# Patient Record
Sex: MALE | URBAN - METROPOLITAN AREA
[De-identification: names, ages, dates, MRNs, and addresses within clinical notes are randomized per-mention and may not be internally consistent; named-entity substitution may affect disease eponyms.]

---

## 2017-11-06 ENCOUNTER — TRANSFERRED RECORDS (OUTPATIENT)
Dept: HEALTH INFORMATION MANAGEMENT | Facility: CLINIC | Age: 52
End: 2017-11-06

## 2017-11-21 ENCOUNTER — TRANSFERRED RECORDS (OUTPATIENT)
Dept: HEALTH INFORMATION MANAGEMENT | Facility: CLINIC | Age: 52
End: 2017-11-21

## 2017-12-08 ENCOUNTER — TRANSFERRED RECORDS (OUTPATIENT)
Dept: HEALTH INFORMATION MANAGEMENT | Facility: CLINIC | Age: 52
End: 2017-12-08

## 2018-01-04 ENCOUNTER — TRANSFERRED RECORDS (OUTPATIENT)
Dept: HEALTH INFORMATION MANAGEMENT | Facility: CLINIC | Age: 53
End: 2018-01-04

## 2018-01-08 ENCOUNTER — TRANSFERRED RECORDS (OUTPATIENT)
Dept: HEALTH INFORMATION MANAGEMENT | Facility: CLINIC | Age: 53
End: 2018-01-08

## 2018-01-09 ENCOUNTER — TRANSFERRED RECORDS (OUTPATIENT)
Dept: HEALTH INFORMATION MANAGEMENT | Facility: CLINIC | Age: 53
End: 2018-01-09

## 2018-01-16 ENCOUNTER — TRANSFERRED RECORDS (OUTPATIENT)
Dept: HEALTH INFORMATION MANAGEMENT | Facility: CLINIC | Age: 53
End: 2018-01-16

## 2018-02-20 ENCOUNTER — PRE VISIT (OUTPATIENT)
Dept: NEUROLOGY | Facility: CLINIC | Age: 53
End: 2018-02-20

## 2018-02-20 RX ORDER — ESCITALOPRAM OXALATE 10 MG/1
10 TABLET ORAL DAILY
COMMUNITY
Start: 2018-01-25

## 2018-02-20 RX ORDER — AMITRIPTYLINE HYDROCHLORIDE 10 MG/1
20 TABLET ORAL AT BEDTIME
COMMUNITY
Start: 2018-01-25

## 2018-02-20 NOTE — TELEPHONE ENCOUNTER
[facility] CLINICAL DOCUMENTATION    Pre-Visit Planning   PREVISIT INFORMATION                                                    Jose Alberto Sofi scheduled for future visit at [facility] specialty clinics.    Patient is scheduled to see Walk (provider) on 02/27/18 (date)  Reason for visit: HSP  Referring provider Pedro Alonzo  Has patient seen previous specialist? Yes.  Name of provider Dr. Alonzo , Clinic/Facility [facility].   Medical Records:  recvd from [facility] and [facility]-   [facility] recvd with waveforms  MRIs requested to be pushed and archiving form sent to imaging        REVIEW                                                      New patient packet mailed to patient: Yes  Medication reconciliation complete: Yes      No current outpatient prescriptions on file.       Allergies: Review of patient's allergies indicates not on file.    (insert provider dot-phrase for provider specific visit requirements)    PLAN/FOLLOW-UP NEEDED                                                      Previsit review complete.  Patient will see provider at future scheduled appointment.     Patient Reminders Given:  Please, make sure you bring an updated list of your medications.   If you are having a procedure, please, present 15 minutes early.  If you need to cancel or reschedule,please call [phone].    Darla Severin-Brown

## 2018-02-27 ENCOUNTER — OFFICE VISIT (OUTPATIENT)
Dept: NEUROLOGY | Facility: CLINIC | Age: 53
End: 2018-02-27
Payer: COMMERCIAL

## 2018-02-27 VITALS
OXYGEN SATURATION: 99 % | WEIGHT: 174 LBS | DIASTOLIC BLOOD PRESSURE: 86 MMHG | BODY MASS INDEX: 24.36 KG/M2 | HEIGHT: 71 IN | SYSTOLIC BLOOD PRESSURE: 121 MMHG | HEART RATE: 119 BPM

## 2018-02-27 DIAGNOSIS — G11.4 HSP (HEREDITARY SPASTIC PARAPLEGIA) (H): Primary | ICD-10-CM

## 2018-02-27 PROCEDURE — 99204 OFFICE O/P NEW MOD 45 MIN: CPT | Performed by: PSYCHIATRY & NEUROLOGY

## 2018-02-27 ASSESSMENT — PAIN SCALES - GENERAL: PAINLEVEL: NO PAIN (0)

## 2018-02-27 NOTE — MR AVS SNAPSHOT
After Visit Summary   2/27/2018    Jose Alberto Farah    MRN: [id number]           Patient Information     Date Of Birth          1965        Visit Information        Provider Department      2/27/2018 2:30 PM Jose Alberto Santiago MD [facility]        Care Instructions    Thank you for choosing [facility] in [place]. It was a pleasure to see you for your office visit today.     The following is a summary of your office visit:    We will be in contact with you tomorrow in regards to coordinating your follow up appointment and any additional testing. We would like to see you back if possible March 8th and the [facility] ([facility])    You were provided  a copy of Dr. Santiago's business card which has all of the contact information needed for him. It is also listed below    Dr. Pamela MD  Neurology  [facility]  [address] [place] [address].    Phone [phone]  Fax [phone]       Please don't hesitate to call us with any additional questions or concerns at the number listed below.  Nurse/clinic contact information: [facility] [phone].             Follow-ups after your visit        Who to contact     If you have questions or need follow up information about today's clinic visit or your schedule please contact [facility] directly at [phone].  Normal or non-critical lab and imaging results will be communicated to you by MyChart, letter or phone within 4 business days after the clinic has received the results. If you do not hear from us within 7 days, please contact the clinic through MyChart or phone. If you have a critical or abnormal lab result, we will notify you by phone as soon as possible.  Submit refill requests through [employer] or call your pharmacy and they will forward the refill request to us. Please allow 3 business days for your refill to be completed.          Additional  "Information About Your Visit        Soceaniqhart Information     [employer] is an electronic gateway that provides easy, online access to your medical records. With [employer], you can request a clinic appointment, read your test results, renew a prescription or communicate with your care team.     To sign up for [employer] visit the website at [email or web address]   You will be asked to enter the access code listed below, as well as some personal information. Please follow the directions to create your username and password.     Your access code is: [email or web address]  Expires: 2018  4:29 PM     Your access code will  in 90 days. If you need help or a new code, please contact your [facility] or call [phone] for assistance.        Care EveryWhere ID     This is your Care EveryWhere ID. This could be used by other organizations to access your [place] medical records  [id number]        Your Vitals Were     Pulse Height Pulse Oximetry BMI (Body Mass Index)          119 1.803 m (5' 11\") 99% 24.27 kg/m2         Blood Pressure from Last 3 Encounters:   18 121/86    Weight from Last 3 Encounters:   18 78.9 kg (174 lb)              Today, you had the following     No orders found for display       Primary Care Provider Office Phone # Fax #    Glory Roe [phone]       [facility] [address] [place] [address]        Equal Access to Services     [facility] AH: Hadii aad ku hadasho Soomaali, waaxda luqadaha, qaybta kaalmada adeegyada, waxay nancy arana la'solitarion . So [facility] [phone].    ATENCIÓN: Si habla español, tiene a jiang disposición servicios gratuitos de asistencia lingüística. Llame al [phone].    We comply with applicable federal civil rights laws and [place] laws. We do not discriminate on the basis of race, color, national origin, age, disability, sex, sexual orientation, or gender identity.            Thank " you!     Thank you for choosing [facility]  for your care. Our goal is always to provide you with excellent care. Hearing back from our patients is one way we can continue to improve our services. Please take a few minutes to complete the written survey that you may receive in the mail after your visit with us. Thank you!             Your Updated Medication List - Protect others around you: Learn how to safely use, store and throw away your medicines at www.disposemymeds.org.          This list is accurate as of 2/27/18  4:29 PM.  Always use your most recent med list.                   Brand Name Dispense Instructions for use Diagnosis    amitriptyline 10 MG tablet    ELAVIL     Take 20 mg by mouth At Bedtime        escitalopram 10 MG tablet    LEXAPRO     Take 10 mg by mouth daily

## 2018-02-27 NOTE — NURSING NOTE
"Jose Alberto Farah's goals for this visit include: consult  He requests these members of his care team be copied on today's visit information:     PCP: Glory Roe    Referring Provider:  Pedro Alonzo MD  [facility]  [address] [place] [address]    Chief Complaint   Patient presents with     Consult     Neuropathy       Initial /86  Pulse 119  Ht 1.803 m (5' 11\")  Wt 78.9 kg (174 lb)  SpO2 99%  BMI 24.27 kg/m2 Estimated body mass index is 24.27 kg/(m^2) as calculated from the following:    Height as of this encounter: 1.803 m (5' 11\").    Weight as of this encounter: 78.9 kg (174 lb).  Medication Reconciliation: complete    "

## 2018-02-27 NOTE — PATIENT INSTRUCTIONS
Thank you for choosing [facility] in [place]. It was a pleasure to see you for your office visit today.     The following is a summary of your office visit:    We will be in contact with you tomorrow in regards to coordinating your follow up appointment and any additional testing. We would like to see you back if possible March 8th and the [facility] ([facility])    You were provided  a copy of Dr. Santiago's business card which has all of the contact information needed for him. It is also listed below    Dr. Pamela MD  Neurology  [facility]  [address] [place] [address] [place] [address].    Phone [phone]  Fax [phone]       Please don't hesitate to call us with any additional questions or concerns at the number listed below.  Nurse/clinic contact information: [facility] [phone].

## 2018-02-27 NOTE — LETTER
2018         RE: Jose Alberto Farah  [address]  [facility] [address]        Dear Colleague,    Thank you for referring your patient, Jose Alberto Farah, to the [facility]. Please see a copy of my visit note below.    [id number]    2018            Glory Roe MD   [address]   [address]   [place]   [address]      RE:  Jose Alberto Farah   MRN:  [id number]   :  1965      Dear Doctors:      I saw Jose Alberto Farah in neuromuscular consultation at Dr. Pedro Alonzo' request today at the [facility] in [place].  Dr. Alonzo referred Mr. Farah for consideration of possible hereditary spastic paraparesis.      Mr. Farah is a 52-year-old man who reports that since early spring of 2017, he has had difficulty walking.  Initially this seemed to affect bilateral lower extremities in an equal fashion; since then he has also developed weakness in the upper extremities, right greater than left.  Symptoms have been progressive and not associated with substantial positive or negative sensory symptoms.  He has had spasms in the legs upon awakening and stretching in the morning.  He has observed, when I inquired, fasciculations in the right upper extremity as well.  He is referred for further evaluation of this problem.      PAST MEDICAL HISTORY:  Relatively unremarkable.      SOCIAL HISTORY:  Notable for the fact that he was a very heavy drinker.  He reports that, until he developed significant balance difficulty from this condition and while was still working,  he would have 6-7 beers and 2-3 shots in the evenings with coworkers after work.  He also smokes about a half pack per day.  He was a .  He is currently on short-term disability because of his medical condition and anticipates moving towards longterm disability.  He lives with a roommate but the roommate is not described as a caretaker.      FAMILY  HISTORY:  Notable for 1 living brother.  He has no children.  His parents are alive, but his mother developed dementia in her early 80s.  His father was one of 9 children, some of who may have neuropathy.  His mother has no siblings and there is no other history of neuromuscular disease in the family.      The patient's full past medical history, social history, allergy list, medication list, family history, occupational history and system review are documented in the electronic medical record and were personally reviewed by me today.      PHYSICAL EXAMINATION:   VITAL SIGNS:  Normal, aside from an initial pulse of 119.  The patient indicates that he has always had a high heart rate.  This was checked after he had walked some distance into the examining room with a 4-wheeled walker.  I rechecked his pulse after he had rested for a few minutes and it was 96 at the time.      Note that Mr. Farah denies symptoms of bulbar dysfunction, nonrestorative sleep, dyspnea or orthopnea.      EXAMINATION:  Neuromuscular examination demonstrates the following: The patient is alert and cooperative.  Speech, language and affect are normal.  He is fully oriented.  Rapid labial, lingual and guttural sounds are produced well.  Pupils are equal, round and reactive to light.  Extraocular movements are full.  Orbicularis oris and orbicularis oculi strength are normal.  The palate elevates well and in the midline upon phonation.  Tongue bulk, coordination and strength are normal.  Pterygoid strength is normal.  Neck flexion and extension strength are normal.  Sensory examination is notable for preserved perception of pin and light touch.  Vibration scores are 5 and 4 at the right and left great toes, respectively.  Motor examination is notable for equivocal increase in tone in the upper extremities and a moderate increase in tone in the lower extremities.  No fasciculations are noted.  There is not definite atrophy.  Manual muscle testing  demonstrates full strength except for 4/5 weakness of right EDC, FDI, and APB.  Despite his difficulty walking, manual muscle testing does appear to demonstrate good strength in the lower extremities.  Reflexes are present at the jaw, 3+ throughout the limbs with horizontal and vertical spread throughout and prominent Makenna signs, and with upgoing toes bilaterally.  Rapid repetitive movements are reduced in the right upper extremity, equivocally reduced in the left upper extremity, and markedly reduced in bilateral lower extremities.  There is no dysmetria.  As noted previously, he does walk independently with a 4-wheeled walker.      MEDICAL RECORD REVIEW:  Vitamin B12 and methylmalonic acid levels are normal.  Imaging of the cervical, thoracic and lumbar spine are unremarkable.  Electrodiagnostic studies demonstrate asymmetric reduction in compound muscle action potential amplitudes in the lower limbs and evidence of chronic denervation and reinnervation in bilateral lower limbs and fibrillations and positive waves in bilateral lower limbs, involving multiple segments on the right.  There is evidence of chronic motor unit changes, principally in the right lower limb.  Fasciculations are rare and present only in the right vastus lateralis and right vastus medialis.  Sensory nerve action potential amplitudes are moderately reduced on the left and normal on the right.      I explained the following to Mr. Farah over the course of a 45-60 minute visit:  Dr. Alonzo' consideration of HSP no doubt stems from the presence of 4 limb spasticity, which is bilateral, with relatively preserved bulk and only minimally reduced strength.  As Dr. Alonzo himself observed, however, the rate of progression according to the patient is somewhat rapid for HSP and the absence of a family history makes it more difficult to confidently anticipate this diagnosis.  Furthermore, his electrodiagnostic studies do demonstrate  fibrillations in positive waves which can be present in complicated HSP, but nonetheless indicate features of lower motor neuron injury.  Mr. Farah inquired regarding the possible effect of alcohol and I indicated that an alcoholic myelopathy is rather unlikely.  I did suggest that we extend his electrodiagnostic studies to the right upper limb, add a serum copper level, and have a more extensive conversation about the differential diagnosis and the relative indication for further investigations such as genetic testing and spinal fluid evaluation.  He prefers to do all of this at a single visit and we will therefore investigate the option of a follow up evaluation next week.  At that time I will also request further evaluations by therapists and pulmonary function studies.               Sincerely,      JOSE ALBERTO MARKS MD             D: 2018   T: 2018   MT: MD      Name:     JOSE ALBERTO FARAH   MRN:      [id number]        Account:      [id number]   :      1965      Document: [id number]       cc: Pedro Roe MD       Again, thank you for allowing me to participate in the care of your patient.        Sincerely,        Jose Alberto Marks MD

## 2018-02-28 NOTE — PROGRESS NOTES
2018            Glory Roe MD   [place] [id number]   [address] [place] [address]   [address]      RE:  Jose Alberto Farah   MRN:  [id number]   :  1965      Dear Doctors:      I saw Jose Alberto Farah in neuromuscular consultation at Dr. Pedro Alonzo' request today at the [facility] in [place].  Dr. Alonzo referred Mr. Farah for consideration of possible hereditary spastic paraparesis.      Mr. Farah is a 52-year-old man who reports that since early spring of 2017, he has had difficulty walking.  Initially this seemed to affect bilateral lower extremities in an equal fashion; since then he has also developed weakness in the upper extremities, right greater than left.  Symptoms have been progressive and not associated with substantial positive or negative sensory symptoms.  He has had spasms in the legs upon awakening and stretching in the morning.  He has observed, when I inquired, fasciculations in the right upper extremity as well.  He is referred for further evaluation of this problem.      PAST MEDICAL HISTORY:  Relatively unremarkable.      SOCIAL HISTORY:  Notable for the fact that he was a very heavy drinker.  He reports that, until he developed significant balance difficulty from this condition and while was still working,  he would have 6-7 beers and 2-3 shots in the evenings with coworkers after work.  He also smokes about a half pack per day.  He was a .  He is currently on short-term disability because of his medical condition and anticipates moving towards longterm disability.  He lives with a roommate but the roommate is not described as a caretaker.      FAMILY HISTORY:  Notable for 1 living brother.  He has no children.  His parents are alive, but his mother developed dementia in her early 80s.  His father was one of 9 children, some of who may have neuropathy.  His mother has no siblings and there is no  other history of neuromuscular disease in the family.      The patient's full past medical history, social history, allergy list, medication list, family history, occupational history and system review are documented in the electronic medical record and were personally reviewed by me today.      PHYSICAL EXAMINATION:   VITAL SIGNS:  Normal, aside from an initial pulse of 119.  The patient indicates that he has always had a high heart rate.  This was checked after he had walked some distance into the examining room with a 4-wheeled walker.  I rechecked his pulse after he had rested for a few minutes and it was 96 at the time.      Note that Mr. Farah denies symptoms of bulbar dysfunction, nonrestorative sleep, dyspnea or orthopnea.      EXAMINATION:  Neuromuscular examination demonstrates the following: The patient is alert and cooperative.  Speech, language and affect are normal.  He is fully oriented.  Rapid labial, lingual and guttural sounds are produced well.  Pupils are equal, round and reactive to light.  Extraocular movements are full.  Orbicularis oris and orbicularis oculi strength are normal.  The palate elevates well and in the midline upon phonation.  Tongue bulk, coordination and strength are normal.  Pterygoid strength is normal.  Neck flexion and extension strength are normal.  Sensory examination is notable for preserved perception of pin and light touch.  Vibration scores are 5 and 4 at the right and left great toes, respectively.  Motor examination is notable for equivocal increase in tone in the upper extremities and a moderate increase in tone in the lower extremities.  No fasciculations are noted.  There is not definite atrophy.  Manual muscle testing demonstrates full strength except for 4/5 weakness of right EDC, FDI, and APB.  Despite his difficulty walking, manual muscle testing does appear to demonstrate good strength in the lower extremities.  Reflexes are present at the jaw, 3+ throughout  the limbs with horizontal and vertical spread throughout and prominent Makenna signs, and with upgoing toes bilaterally.  Rapid repetitive movements are reduced in the right upper extremity, equivocally reduced in the left upper extremity, and markedly reduced in bilateral lower extremities.  There is no dysmetria.  As noted previously, he does walk independently with a 4-wheeled walker.      MEDICAL RECORD REVIEW:  Vitamin B12 and methylmalonic acid levels are normal.  Imaging of the cervical, thoracic and lumbar spine are unremarkable.  Electrodiagnostic studies demonstrate asymmetric reduction in compound muscle action potential amplitudes in the lower limbs and evidence of chronic denervation and reinnervation in bilateral lower limbs and fibrillations and positive waves in bilateral lower limbs, involving multiple segments on the right.  There is evidence of chronic motor unit changes, principally in the right lower limb.  Fasciculations are rare and present only in the right vastus lateralis and right vastus medialis.  Sensory nerve action potential amplitudes are moderately reduced on the left and normal on the right.      I explained the following to Mr. Farah over the course of a 45-60 minute visit:  Dr. Alonzo' consideration of HSP no doubt stems from the presence of 4 limb spasticity, which is bilateral, with relatively preserved bulk and only minimally reduced strength.  As Dr. Alonzo himself observed, however, the rate of progression according to the patient is somewhat rapid for HSP and the absence of a family history makes it more difficult to confidently anticipate this diagnosis.  Furthermore, his electrodiagnostic studies do demonstrate fibrillations in positive waves which can be present in complicated HSP, but nonetheless indicate features of lower motor neuron injury.  Mr. Farah inquired regarding the possible effect of alcohol and I indicated that an alcoholic myelopathy is rather  unlikely.  I did suggest that we extend his electrodiagnostic studies to the right upper limb, add a serum copper level, and have a more extensive conversation about the differential diagnosis and the relative indication for further investigations such as genetic testing and spinal fluid evaluation.  He prefers to do all of this at a single visit and we will therefore investigate the option of a follow up evaluation next week.  At that time I will also request further evaluations by therapists and pulmonary function studies.               Sincerely,      DESTINEY MARKS MD             D: 2018   T: 2018   MT: MD      Name:     DESTINEY SLOAN   MRN:      [id number]        Account:      [id number]   :      1965      Document: [id number]       cc: Pedro Roe MD

## 2018-03-02 ENCOUNTER — TELEPHONE (OUTPATIENT)
Dept: NEUROLOGY | Facility: CLINIC | Age: 53
End: 2018-03-02

## 2018-03-02 DIAGNOSIS — G11.4 HSP (HEREDITARY SPASTIC PARAPLEGIA) (H): Primary | ICD-10-CM

## 2018-03-02 NOTE — TELEPHONE ENCOUNTER
[facility] Call Center    Phone Message: Rosalie  Best contact: [phone]    May a detailed message be left on voicemail: yes    Reason for Call: Rosalie called today from [employer].  She is trying to get a copy of Jose Alberto's last visit note so he can receive his disbursement.  Rosalie is trying to expedite this for Jose Alberto and wanted to see if the Clinic could send the last visit note instead of a delay via med records.  Please advise.  Thank you.  Fax: [phone] Claim# [id number]    Action Taken: Message routed to:  Adult Clinics: Neurology p [id number]

## 2018-03-02 NOTE — TELEPHONE ENCOUNTER
Left message for Rosalie that we will need a consent from patient to release the last office not to them. I left our fax number [phone] and phone number for questions.    Lelia Grady LPN

## 2018-03-08 ENCOUNTER — OFFICE VISIT (OUTPATIENT)
Dept: NEUROLOGY | Facility: CLINIC | Age: 53
End: 2018-03-08
Payer: COMMERCIAL

## 2018-03-08 ENCOUNTER — ALLIED HEALTH/NURSE VISIT (OUTPATIENT)
Dept: NEUROLOGY | Facility: CLINIC | Age: 53
End: 2018-03-08

## 2018-03-08 ENCOUNTER — THERAPY VISIT (OUTPATIENT)
Dept: PHYSICAL THERAPY | Facility: CLINIC | Age: 53
End: 2018-03-08
Payer: COMMERCIAL

## 2018-03-08 DIAGNOSIS — R26.89 IMPAIRED GAIT AND MOBILITY: ICD-10-CM

## 2018-03-08 DIAGNOSIS — G12.21 ALS (AMYOTROPHIC LATERAL SCLEROSIS) (H): Primary | ICD-10-CM

## 2018-03-08 DIAGNOSIS — Z71.9 VISIT FOR COUNSELING: Primary | ICD-10-CM

## 2018-03-08 DIAGNOSIS — G12.21 ALS (AMYOTROPHIC LATERAL SCLEROSIS) (H): ICD-10-CM

## 2018-03-08 DIAGNOSIS — G11.4 HSP (HEREDITARY SPASTIC PARAPLEGIA) (H): ICD-10-CM

## 2018-03-08 DIAGNOSIS — G11.4 HSP (HEREDITARY SPASTIC PARAPLEGIA) (H): Primary | ICD-10-CM

## 2018-03-08 LAB
ALBUMIN SERPL-MCNC: 3.9 G/DL (ref 3.4–5)
ALP SERPL-CCNC: 82 U/L (ref 40–150)
ALT SERPL W P-5'-P-CCNC: 38 U/L (ref 0–70)
AST SERPL W P-5'-P-CCNC: 24 U/L (ref 0–45)
BILIRUB DIRECT SERPL-MCNC: <0.1 MG/DL (ref 0–0.2)
BILIRUB SERPL-MCNC: 0.4 MG/DL (ref 0.2–1.3)
PROT SERPL-MCNC: 7.4 G/DL (ref 6.8–8.8)

## 2018-03-08 RX ORDER — RILUZOLE 50 MG/1
50 TABLET, FILM COATED ORAL EVERY 12 HOURS
Qty: 60 TABLET | Refills: 1 | Status: SHIPPED | OUTPATIENT
Start: 2018-03-08

## 2018-03-08 RX ORDER — RILUZOLE 50 MG/1
50 TABLET, FILM COATED ORAL EVERY 12 HOURS
Qty: 60 TABLET | Refills: 3 | Status: SHIPPED | OUTPATIENT
Start: 2018-03-08 | End: 2018-03-08

## 2018-03-08 NOTE — PROGRESS NOTES
.  ALS Social Work Assessment  Collaborated with: Jose Alberto, his lifelong friend, Dr Pamela Jain and members of the ALS interdisciplinary team  Support System: Cristobal Gutiérrezg his friend that he has known since childhood and his brother.   Living Situation: Jose Alberto is . No children. Lives mostly alone in a house with stairs(sometimes he has a room mate)  Functional Capacity: Walks with a walker.  Primary Caregiver: none identified today  Employment status: Left work on STD/LTD which is just getting started.   Financial stability/insurance: He has insurance thru employer thru the end of the year in which he pays $180/month. His income is adequate to meet his expenses but income is low. Reviewed applying for SSDI and encouraged him to apply by phone and provided #.   Agencies involved: to be registered with ALSA by phone call soon  Mental Health/CD/Cognitive concerns: Addressed by Dr Santiago. History of depression was on antidepressant but stopped in the past when he felt he no longer needed it. Long term heavy drinker but he cut down. Did brief co-visit with Dr Santiago when pt denied any current suicidal plans. Dr Santiago also addressed future trajectory and addressed some of his fears.  Coping style/adjustment to ALS: Pt just got the news today. His friend said they were expecting possibly good news today and were not expecting an ALS dx. Pt shocked and tearful. Assessment and discussion was limited as he needs time to adjust to dx and was overwhelmed today. He did agree to having our behavioral health clinician contact him to discuss his interest in counseling. I will ask Edy Hussein to contact him.   Goals/Strengths: Has STD/LTD/health coverage. He is interested in pursuing medications for ALS.  Resource Needs: Discussion was very limited today as intervention was primarily for support with new diagnosis. Because he lives alone, anticipate he will need caregivers soon. Need to address eligibility for programs and what  caregivers he may have available. He is over income for [place]/CADI and would have a significant spend down. He indicated he has not been driving. Will review access to [employer] at his location as it may be limited.  Pt to have a home safety evaluation with PT Sona Barrett.  Education Provided: ALSA services, [facility], [employer], counseling, self care  Intervention/Assessment: Support and Information  Plan: f/u with Pt by phone re [employer], [employer]

## 2018-03-08 NOTE — MR AVS SNAPSHOT
After Visit Summary   3/8/2018    Jose Alberto Farah    MRN: [id number]           Patient Information     Date Of Birth          1965        Visit Information        Provider Department      3/8/2018 11:11 AM Genna Russo LICSW [facility]        Today's Diagnoses     Visit for counseling    -  1       Follow-ups after your visit        Your next 10 appointments already scheduled     2018 11:00 AM CDT   (Arrive by 10:45 AM)   Return ALS/Motor Neuron with Jose Alberto Santiago MD   [facility] ([facility])    [address] [place] [address] [phone]              Future tests that were ordered for you today     Open Future Orders        Priority Expected Expires Ordered    EMG Thoracic Paraspinal Muscles (55715) Routine  3/8/2019 3/8/2018    PHYSICAL THERAPY REFERRAL Routine 3/7/2018 3/7/2019 3/7/2018    OCCUPATIONAL THERAPY REFERRAL Routine 3/7/2018 3/7/2019 3/7/2018    SPEECH THERAPY REFERRAL Routine 3/7/2018 3/7/2019 3/7/2018            Who to contact     Please call your clinic at [phone] to:    Ask questions about your health    Make or cancel appointments    Discuss your medicines    Learn about your test results    Speak to your doctor            Additional Information About Your Visit        MyChart Information     [employer] is an electronic gateway that provides easy, online access to your medical records. With Jogli, you can request a clinic appointment, read your test results, renew a prescription or communicate with your care team.     To sign up for [employer] visit the website at [email or web address]   You will be asked to enter the access code listed below, as well as some personal information. Please follow the directions to create your username and password.     Your access code is: [email or web address]  Expires: 2018  4:29 PM     Your access code will  in 90 days. If you need help or a new code, please  contact your [facility] or call [phone] for assistance.        Care EveryWhere ID     This is your Care EveryWhere ID. This could be used by other organizations to access your [facility] medical records  [id number]         Blood Pressure from Last 3 Encounters:   02/27/18 121/86    Weight from Last 3 Encounters:   02/27/18 78.9 kg (174 lb)              Today, you had the following     No orders found for display       Primary Care Provider Office Phone # Fax #    Glory Roe [phone]       [facility] [address] [address]        Equal Access to Services     [facility]: Hadii aad ku hadasho Soomaali, waaxda luqadaha, qaybta kaalmada adeegyada, iva cruz hayaan marko hannon . So [facility] [phone].    ATENCIÓN: Si habla español, tiene a jiang disposición servicios gratuitos de asistencia lingüística. [facility] [phone].    We comply with applicable federal civil rights laws and [place] laws. We do not discriminate on the basis of race, color, national origin, age, disability, sex, sexual orientation, or gender identity.            Thank you!     Thank you for choosing [facility]  for your care. Our goal is always to provide you with excellent care. Hearing back from our patients is one way we can continue to improve our services. Please take a few minutes to complete the written survey that you may receive in the mail after your visit with us. Thank you!             Your Updated Medication List - Protect others around you: Learn how to safely use, store and throw away your medicines at www.disposemymeds.org.          This list is accurate as of 3/8/18  2:13 PM.  Always use your most recent med list.                   Brand Name Dispense Instructions for use Diagnosis    amitriptyline 10 MG tablet    ELAVIL     Take 20 mg by mouth At Bedtime        escitalopram 10 MG tablet    LEXAPRO     Take 10 mg by mouth daily

## 2018-03-08 NOTE — MR AVS SNAPSHOT
After Visit Summary   3/8/2018    Jose Alberto Farah    MRN: [id number]           Patient Information     Date Of Birth          1965        Visit Information        Provider Department      3/8/2018 8:00 AM Sadiq White PT [facility]        Today's Diagnoses     ALS (amyotrophic lateral sclerosis) (H)    -  1    Impaired gait and mobility           Follow-ups after your visit        Your next 10 appointments already scheduled     Apr 19, 2018 11:00 AM CDT   (Arrive by 10:45 AM)   Return ALS/Motor Neuron with Jose Alberto Santiago MD   [facility] ([facility])    [address] [place] [address] [phone]              Future tests that were ordered for you today     Open Future Orders        Priority Expected Expires Ordered    EMG Thoracic Paraspinal Muscles (08002) Routine  3/8/2019 3/8/2018    PHYSICAL THERAPY REFERRAL Routine 3/7/2018 3/7/2019 3/7/2018    OCCUPATIONAL THERAPY REFERRAL Routine 3/7/2018 3/7/2019 3/7/2018    SPEECH THERAPY REFERRAL Routine 3/7/2018 3/7/2019 3/7/2018            Who to contact     Please call your clinic at [phone] to:    Ask questions about your health    Make or cancel appointments    Discuss your medicines    Learn about your test results    Speak to your doctor            Additional Information About Your Visit        MyChart Information     Zalicus is an electronic gateway that provides easy, online access to your medical records. With Zalicus, you can request a clinic appointment, read your test results, renew a prescription or communicate with your care team.     To sign up for [employer] visit the website at [email or web address]   You will be asked to enter the access code listed below, as well as some personal information. Please follow the directions to create your username and password.     Your access code is: [email or web address]  Expires: 5/28/2018  4:29 PM     Your access  code will  in 90 days. If you need help or a new code, please contact your [facility] or call [phone] for assistance.        Care EveryWhere ID     This is your Care EveryWhere ID. This could be used by other organizations to access your [facility] medical records  [id number]         Blood Pressure from Last 3 Encounters:   18 121/86    Weight from Last 3 Encounters:   18 78.9 kg (174 lb)              Today, you had the following     No orders found for display       Primary Care Provider Office Phone # Fax #    Glory Roe [phone]       [facility] [address] [address]        Equal Access to Services     [facility] : Hadii aad capri hadasho Soomaali, waaxda luqadaha, qaybta kaalmada adeegyada, iva hannon . So [facility] [phone].    ATENCIÓN: Si habla español, tiene a jiang disposición servicios gratuitos de asistencia lingüística. [facility] [phone].    We comply with applicable federal civil rights laws and [place] laws. We do not discriminate on the basis of race, color, national origin, age, disability, sex, sexual orientation, or gender identity.            Thank you!     Thank you for choosing [facility]  for your care. Our goal is always to provide you with excellent care. Hearing back from our patients is one way we can continue to improve our services. Please take a few minutes to complete the written survey that you may receive in the mail after your visit with us. Thank you!             Your Updated Medication List - Protect others around you: Learn how to safely use, store and throw away your medicines at www.disposemymeds.org.          This list is accurate as of 3/8/18 12:31 PM.  Always use your most recent med list.                   Brand Name Dispense Instructions for use Diagnosis    amitriptyline 10 MG tablet    ELAVIL     Take 20 mg by mouth  At Bedtime        escitalopram 10 MG tablet    LEXAPRO     Take 10 mg by mouth daily

## 2018-03-08 NOTE — PROGRESS NOTES
[facility]  Electrodiagnostic Laboratory    Nerve Conduction & EMG Report          Patient:       Jose Alberto Farah  Patient ID:    [id number]  Gender:        Male  YOB: 1965  Age:           52 Years 9 Months        History & Examination:  Jose Alberto Farah is a 52 year old man with stiffness in bilateral lower limbs and weakness in the lower and right upper limbs. Examination demonstrates hyperreflexia in all four limbs, increased tone in the lower limbs, and mild weakness of right extrinsic and intrinsic hand muscles. A recent electrodiagnostic study demonstrated chronic neurogenic findings in one lower limbs and fibrillation potentials in the other. He is referred for the purpose of extending this study to include the upper limb and to further investigate this asymmetric finding.    Techniques: Motor conduction studies were done with surface recording electrodes. Sensory conduction studies were performed with surface electrodes, unless indicated otherwise by (n), designating the use of subdermal recording electrodes. Temperature was monitored and recorded throughout the study. Upper extremities were maintained at a temperature of 32 degrees Centigrade or higher.  Lower extremities were maintained at a temperature of 31degrees Centigrade or higher. EMG was done with a concentric needle electrode. Note that the table of EMG findings records all fibrillations and positive sharp waves under the fibrillations heading.      Results:  Bilateral sural, right median, and right ulnar sensory conduction studies were normal. A right median motor conduction study demonstrated moderate attenuation of amplitude and was otherwise normal. A right ulnar motor conduction study demonstrated minimal prolongation of distal latency and was otherwise normal. Right peroneal motor conduction studies demonstrated mild attenuation of amplitude recording from the extensor digitorum brevis and were otherwise normal. A right  tibial motor conduction study was normal. A right tibial F-response study was normal. Right median and ulnar F-response studies demonstrated reduced F-persistence and repeater F-waves, with mildly prolonged minimum F-response latency. Electromyography demonstrated fibrillation potentials and positive sharp waves in all muscles studied and fasciculation potentials in some muscles as indicated in the table. Voluntary activation demonstrated normal or mildly increased motor unit amplitude and duration, and normal or reduced recruitment, as indicated in the table.    Interpretation:  This is an abnormal study, demonstrating electrophysiologic evidence of a disorder of motor axons, anterior roots, or motor neurons in multiple cervical, thoracic, and lumbosacral segments. 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      Jose Alberto Santiago M.D.         Sensory NCS      Nerve / Sites Rec. Site Onset Peak NP Amp Ref. PP Amp Dist Jona Ref. Temp     ms ms  V  V  V cm m/s m/s  C   R MEDIAN - Ortho      Dig II Wrist 2.76 3.33 10.3 10.0 8.2 14 50.7 48.0 33   R ULNAR - Dig V      Dig V Wrist 2.34 2.92 9.6 8.0 6.6 12.5 53.3 48.0 33.4   R SURAL       Calf Ankle 2.97 3.85 9.0 5.0 6.1 14 47.2 38.0 32.4   L SURAL       Calf Ankle 3.23 4.11 8.3 5.0 5.6 14 43.4 38.0 32      Calf Ankle 3.33 4.01 8.6 5.0 4.6 14 42.0  32.1       Motor NCS      Nerve / Sites Rec. Site Lat Ref. Amp Ref. Rel Amp Dist Jona Ref. Dur. Area Temp.     ms ms mV mV % cm m/s m/s ms %  C   R MEDIAN - APB      Wrist APB 3.85 4.40 3.0 5.0 100 8   6.09 100 33      Elbow APB 8.80  2.7  90.9 24.5 49.5 48.0 6.82 97.2 33      Axilla APB 11.09  2.5  84 13 56.7  7.55 84.3 34.9   R ULNAR - ADM      Wrist ADM 3.54 3.50 5.9 5.0 100 8   5.68 100 32.7      B.Elbow ADM 8.13  4.8  81.4 22 48.0 48.0 6.41 92.6 32.7      A.Elbow ADM 10.00  4.5  75.9 9 48.0 48.0 6.46 89.9 32.7      Axilla ADM 12.03  4.2  71.1 10 49.2 48.0 6.93  89.3 32.7   R DEEP PERONEAL - EDB 60      Ankle EDB 3.96 6.00 1.8 2.0 100 8   6.51 100 31.7      FibHead EDB 13.13  1.4  80.2 37 40.4 38.0 7.29 79.5 31.6      Pop Fos EDB 14.64  1.2  65.8 7 46.3 38.0 7.14 74.4 31.6   R TIBIAL - AH      Ankle AH 3.54 6.00 5.6 4.0 100 8   7.03 100 31      Pop Fos AH 14.79  4.3  76.3 45 40.0 38.0 8.02 108 31.2   R PERONEAL - Tib Ant      Fib Head Tib Ant 4.84  3.7  100 12   13.18 100 31.2      Knee Tib Ant 6.30  3.5  94.1 7 48.0  13.54 102 31.2       F  Wave      Nerve Min F Lat Max F Lat Mean FLat Temp.    ms ms ms  C   R TIBIAL 56.09 63.59 60.53 30.8   R ULNAR 34.32 41.77 36.93 32.1   R MEDIAN 36.25 36.67 36.49 34.8       EMG Summary Table     Spontaneous MUAP Recruitment    IA Fib PSW Fasc H.F. Amp Dur. PPP Pattern   R. PRON TERES Increased 3+ None 1+ None 1+ 1+ N Moderately Reduced   R. EXT DIG COMM Increased 1+ None 1+ None N N N Mildly Reduced   R. FIRST D INTEROSS Increased 1+ None None None N N N N   R. BICEPS Increased 1+ None None None N N N N   R. DELTOID Increased 2+ None 1+ None N N N N   R. TIB ANTERIOR Increased 2+ None None None N N N Mildly Reduced   R. GASTROCN (MED) Increased 2+ None None None N N N Mildly Reduced   L. GASTROCN (MED) Increased 1+ None None None N N N N   L. TIB ANTERIOR Increased 2+ None None None 1+ 1+ N Mildly Reduced   R. THOR PSP (M) Increased 2+ None None None N N N N

## 2018-03-08 NOTE — MR AVS SNAPSHOT
After Visit Summary   3/8/2018    Jose Alberto Farah    MRN: [id number]           Patient Information     Date Of Birth          1965        Visit Information        Provider Department      3/8/2018 8:15 AM Jose Alberto Santiago MD [facility]        Today's Diagnoses     ALS (amyotrophic lateral sclerosis) (H)    -  1       Follow-ups after your visit        Your next 10 appointments already scheduled     2018 11:00 AM CDT   (Arrive by 10:45 AM)   Return ALS/Motor Neuron with Jose Alberto Santiago MD   [facility] ([facility])    [address] [place] [address] [phone]              Future tests that were ordered for you today     Open Future Orders        Priority Expected Expires Ordered    EMG Thoracic Paraspinal Muscles (43104) Routine  3/8/2019 3/8/2018    PHYSICAL THERAPY REFERRAL Routine 3/7/2018 3/7/2019 3/7/2018    OCCUPATIONAL THERAPY REFERRAL Routine 3/7/2018 3/7/2019 3/7/2018    SPEECH THERAPY REFERRAL Routine 3/7/2018 3/7/2019 3/7/2018            Who to contact     Please call your clinic at [phone] to:    Ask questions about your health    Make or cancel appointments    Discuss your medicines    Learn about your test results    Speak to your doctor            Additional Information About Your Visit        MyChart Information     Afoundriat is an electronic gateway that provides easy, online access to your medical records. With [employer] you can request a clinic appointment, read your test results, renew a prescription or communicate with your care team.     To sign up for Afoundriat visit the website at [email or web address]   You will be asked to enter the access code listed below, as well as some personal information. Please follow the directions to create your username and password.     Your access code is: [email or web address]  Expires: 2018  4:29 PM     Your access code will  in 90 days. If you need help or a new code,  please contact your [facility] or call [phone] for assistance.        Care EveryWhere ID     This is your Care EveryWhere ID. This could be used by other organizations to access your [facility] medical records  [id number]         Blood Pressure from Last 3 Encounters:   02/27/18 121/86    Weight from Last 3 Encounters:   02/27/18 78.9 kg (174 lb)              We Performed the Following     NCS Motor with or without F-Wave, 7-8 nerves (85953)     NEEDLE EMG 1 EXTREMITY (77703)     NEEDLE EMG LIMITED 1 EXTREMITY/NON-LIMB MUSCLES (70063)        Primary Care Provider Office Phone # Fax #    Glory Roe [phone]       [facility] [address] [address]        Equal Access to Services     [facility] : Hadii garry farooq hadasho Soomaali, waaxda luqadaha, qaybta kaalmada adeegyada, waxay ryanin haysolitarion marko hannon . So [facility] [phone].    ATENCIÓN: Si habla español, tiene a jiang disposición servicios gratuitos de asistencia lingüística. Llame al [phone].    We comply with applicable federal civil rights laws and [place] laws. We do not discriminate on the basis of race, color, national origin, age, disability, sex, sexual orientation, or gender identity.            Thank you!     Thank you for choosing [facility]  for your care. Our goal is always to provide you with excellent care. Hearing back from our patients is one way we can continue to improve our services. Please take a few minutes to complete the written survey that you may receive in the mail after your visit with us. Thank you!             Your Updated Medication List - Protect others around you: Learn how to safely use, store and throw away your medicines at www.disposemymeds.org.          This list is accurate as of 3/8/18 11:34 AM.  Always use your most recent med list.                   Brand Name Dispense Instructions for use Diagnosis    amitriptyline 10 MG tablet     ELAVIL     Take 20 mg by mouth At Bedtime        escitalopram 10 MG tablet    LEXAPRO     Take 10 mg by mouth daily

## 2018-03-08 NOTE — LETTER
3/8/2018       RE: Jose Alberto Farah  [address]  [place] [address]     Dear Colleague,    Thank you for referring your patient, Jose Alberto Farah, to the [facility] EMG at [facility]. Please see a copy of my visit note below.    [facility]  Electrodiagnostic Laboratory    Nerve Conduction & EMG Report          Patient:       Jose Alberto Farah  Patient ID:    [id number]  Gender:        Male  YOB: 1965  Age:           52 Years 9 Months        History & Examination:  Jose Alberto Farah is a 52 year old man with stiffness in bilateral lower limbs and weakness in the lower and right upper limbs. Examination demonstrates hyperreflexia in all four limbs, increased tone in the lower limbs, and mild weakness of right extrinsic and intrinsic hand muscles. A recent electrodiagnostic study demonstrated chronic neurogenic findings in one lower limbs and fibrillation potentials in the other. He is referred for the purpose of extending this study to include the upper limb and to further investigate this asymmetric finding.    Techniques: Motor conduction studies were done with surface recording electrodes. Sensory conduction studies were performed with surface electrodes, unless indicated otherwise by (n), designating the use of subdermal recording electrodes. Temperature was monitored and recorded throughout the study. Upper extremities were maintained at a temperature of 32 degrees Centigrade or higher.  Lower extremities were maintained at a temperature of 31degrees Centigrade or higher. EMG was done with a concentric needle electrode. Note that the table of EMG findings records all fibrillations and positive sharp waves under the fibrillations heading.      Results:  Bilateral sural, right median, and right ulnar sensory conduction studies were normal. A right median motor conduction study demonstrated moderate attenuation of amplitude and was otherwise normal. A right ulnar motor  conduction study demonstrated minimal prolongation of distal latency and was otherwise normal. Right peroneal motor conduction studies demonstrated mild attenuation of amplitude recording from the extensor digitorum brevis and were otherwise normal. A right tibial motor conduction study was normal. A right tibial F-response study was normal. Right median and ulnar F-response studies demonstrated reduced F-persistence and repeater F-waves, with mildly prolonged minimum F-response latency. Electromyography demonstrated fibrillation potentials and positive sharp waves in all muscles studied and fasciculation potentials in some muscles as indicated in the table. Voluntary activation demonstrated normal or mildly increased motor unit amplitude and duration, and normal or reduced recruitment, as indicated in the table.    Interpretation:  This is an abnormal study, demonstrating electrophysiologic evidence of a disorder of motor axons, anterior roots, or motor neurons in multiple cervical, thoracic, and lumbosacral segments. 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      Jose Alberto Santiago M.D.         Sensory NCS      Nerve / Sites Rec. Site Onset Peak NP Amp Ref. PP Amp Dist Jona Ref. Temp     ms ms  V  V  V cm m/s m/s  C   R MEDIAN - Ortho      Dig II Wrist 2.76 3.33 10.3 10.0 8.2 14 50.7 48.0 33   R ULNAR - Dig V      Dig V Wrist 2.34 2.92 9.6 8.0 6.6 12.5 53.3 48.0 33.4   R SURAL       Calf Ankle 2.97 3.85 9.0 5.0 6.1 14 47.2 38.0 32.4   L SURAL       Calf Ankle 3.23 4.11 8.3 5.0 5.6 14 43.4 38.0 32      Calf Ankle 3.33 4.01 8.6 5.0 4.6 14 42.0  32.1       Motor NCS      Nerve / Sites Rec. Site Lat Ref. Amp Ref. Rel Amp Dist Jona Ref. Dur. Area Temp.     ms ms mV mV % cm m/s m/s ms %  C   R MEDIAN - APB      Wrist APB 3.85 4.40 3.0 5.0 100 8   6.09 100 33      Elbow APB 8.80  2.7  90.9 24.5 49.5 48.0 6.82 97.2 33      Axilla APB 11.09  2.5  84 13 56.7   7.55 84.3 34.9   R ULNAR - ADM      Wrist ADM 3.54 3.50 5.9 5.0 100 8   5.68 100 32.7      B.Elbow ADM 8.13  4.8  81.4 22 48.0 48.0 6.41 92.6 32.7      A.Elbow ADM 10.00  4.5  75.9 9 48.0 48.0 6.46 89.9 32.7      Axilla ADM 12.03  4.2  71.1 10 49.2 48.0 6.93 89.3 32.7   R DEEP PERONEAL - EDB 60      Ankle EDB 3.96 6.00 1.8 2.0 100 8   6.51 100 31.7      FibHead EDB 13.13  1.4  80.2 37 40.4 38.0 7.29 79.5 31.6      Pop Fos EDB 14.64  1.2  65.8 7 46.3 38.0 7.14 74.4 31.6   R TIBIAL - AH      Ankle AH 3.54 6.00 5.6 4.0 100 8   7.03 100 31      Pop Fos AH 14.79  4.3  76.3 45 40.0 38.0 8.02 108 31.2   R PERONEAL - Tib Ant      Fib Head Tib Ant 4.84  3.7  100 12   13.18 100 31.2      Knee Tib Ant 6.30  3.5  94.1 7 48.0  13.54 102 31.2       F  Wave      Nerve Min F Lat Max F Lat Mean FLat Temp.    ms ms ms  C   R TIBIAL 56.09 63.59 60.53 30.8   R ULNAR 34.32 41.77 36.93 32.1   R MEDIAN 36.25 36.67 36.49 34.8       EMG Summary Table     Spontaneous MUAP Recruitment    IA Fib PSW Fasc H.F. Amp Dur. PPP Pattern   R. PRON TERES Increased 3+ None 1+ None 1+ 1+ N Moderately Reduced   R. EXT DIG COMM Increased 1+ None 1+ None N N N Mildly Reduced   R. FIRST D INTEROSS Increased 1+ None None None N N N N   R. BICEPS Increased 1+ None None None N N N N   R. DELTOID Increased 2+ None 1+ None N N N N   R. TIB ANTERIOR Increased 2+ None None None N N N Mildly Reduced   R. GASTROCN (MED) Increased 2+ None None None N N N Mildly Reduced   L. GASTROCN (MED) Increased 1+ None None None N N N N   L. TIB ANTERIOR Increased 2+ None None None 1+ 1+ N Mildly Reduced   R. THOR PSP (M) Increased 2+ None None None N N N N                                    Again, thank you for allowing me to participate in the care of your patient.      Sincerely,    Jose Alberto Santiago MD

## 2018-03-08 NOTE — PROGRESS NOTES
"    OUTPATIENT PHYSICAL THERAPY CLINIC NOTE  Jose Alberto Farah     YOB: 1965  [id number]    Type of visit:         Evaluation     Date of service: 3/8/2018    Referring provider: Dr. Jose Alberto Santiago    Others present at visit:      Medical diagnosis:   Amyotrophic lateral sclerosis (ALS)    Date of diagnosis: 3/8/2018    Pertinent history of current problem (include personal factors and/or comorbidities that impact the plan of care): Patient received diagnosis today and requested to leave clinic ASAP. He did agree to walk and reports some difficulty with mobility in his home setting.     Cardio-respiratory status:  Forced vital capacity: 106 % of predicted     Height/Weight: 5', 11\" / 174 lbs.    Living environment:  House    Living environment barriers:  3 stairs to enter (1 railing present)     Current assistance/living environment:  Lives with roommate.      Current mobility equipment:  4 wheeled walker with seat     Current ADL equipment:  None    Technology used: not discussed.    Patient concerns/goals: Patient reports history of falls until he began using 4WW.  Reports no falls since using 4WW for all ambulation however he states he hits his feet on the wheels of the walker intermittently when walking.     Evaluation   Interview completed.   Range of motion: B LE grossly WNL.      Manual muscle testing:  B LE grossly 4+/5   Gait:  Slow janene, poor foot clearance, poor heel strike, flexed posture, hit wheels with L foot intermittently during gait. 25' timed walk 22 steps, 23 seconds with 4WW.    Cognition:  Not assessed       Fall Risk Screen:   Has the patient fallen 2 or more times in the last year? Yes (no falls since he started using 4WW)      Has the patient fallen and had an injury in the past year? Not assessed       Timed Up and Go Score: Not tested.    Is the patient a fall risk? Yes, department fall risk interventions implemented     Impairments:  Fatigue, impaired coordination, impaired " balance, decreased functional endurance.     Treatment diagnosis:  Impaired mobility    Clinical Presentation: Evolving/Changing  Clinical Presentation Rationale: clinical judgment, comorbidities  Clinical Decision Making (Complexity): Low complexity     Recommendations/Plan of care:  Patient would benefit from interventions to enhance safety and independence.   Recommend home safety evaluation/recommendations with Amy Barrett PT. Also recommend patient be assessed for taller, wider 4WW which may be available from ALS loan pool.     Treatment provided this date:   Evaluation only and recommendation for home safety evaluation.      Response to treatment/recommendations: Patient verbalized understanding and agreed with home evaluation recommendation.     Risks and benefits of evaluation/treatment have been explained.  Patient, family and/or caregiver are in agreement with Plan of Care.     Timed Code Treatment Minutes: 0   Total Treatment Time (sum of timed and untimed services): 12    Signature:    Sadiq Lyons, PT  Physical Therapist  [facility]   [email or web address]   Pager: [phone]      Date: 3/8/2018

## 2018-03-08 NOTE — MR AVS SNAPSHOT
After Visit Summary   3/8/2018    Jose Alberto Farah    MRN: [id number]           Patient Information     Date Of Birth          1965        Visit Information        Provider Department      3/8/2018 8:00 AM Jose Alberto Santiago MD [facility] Neurology        Today's Diagnoses     HSP (hereditary spastic paraplegia) (H)    -  1       Follow-ups after your visit        Additional Services     OCCUPATIONAL THERAPY REFERRAL       OT Clinician to evaluate and treat patient in clinic.            PHYSICAL THERAPY REFERRAL       PT Clinician to evaluate and treat patient in clinic.            SPEECH THERAPY REFERRAL       Speech Language Pathologist to evaluate and treat patient in ALS Clinic.                  Your next 10 appointments already scheduled     Mar 08, 2018 11:15 AM CST   LAB with  LAB   [facility] ([place] [facility])    [address] [place] [address]  [address] [facility] [address] [phone]           Please do not eat 10-12 hours before your appointment if you are coming in fasting for labs on lipids, cholesterol, or glucose (sugar). This does not apply to pregnant women. Water, hot tea and black coffee (with nothing added) are okay. Do not drink other fluids, diet soda or chew gum.            Apr 19, 2018 11:00 AM CDT   (Arrive by 10:45 AM)   Return ALS/Motor Neuron with Jose Alberto Santiago MD   [facility]rology ([place] [facility])    [address] [place] [address] [phone]              Future tests that were ordered for you today     Open Future Orders        Priority Expected Expires Ordered    Hepatic panel Routine  3/8/2019 3/8/2018    PHYSICAL THERAPY REFERRAL Routine 3/7/2018 3/7/2019 3/7/2018    OCCUPATIONAL THERAPY REFERRAL Routine 3/7/2018 3/7/2019 3/7/2018    SPEECH THERAPY REFERRAL Routine 3/7/2018 3/7/2019 3/7/2018            Who to contact     Please call your clinic at [phone] to:    Ask questions about your  health    Make or cancel appointments    Discuss your medicines    Learn about your test results    Speak to your doctor            Additional Information About Your Visit        MyChart Information     [employer] is an electronic gateway that provides easy, online access to your medical records. With [employer], you can request a clinic appointment, read your test results, renew a prescription or communicate with your care team.     To sign up for [employer] visit the website at [email or web address]   You will be asked to enter the access code listed below, as well as some personal information. Please follow the directions to create your username and password.     Your access code is: [email or web address]  Expires: 2018  4:29 PM     Your access code will  in 90 days. If you need help or a new code, please contact your [facility] or call [phone] for assistance.        Care EveryWhere ID     This is your Care EveryWhere ID. This could be used by other organizations to access your [place] medical records  [id number]         Blood Pressure from Last 3 Encounters:   18 121/86    Weight from Last 3 Encounters:   18 78.9 kg (174 lb)               Primary Care Provider Office Phone # Fax #    Glory Malhotra Bhupinder [phone]       [facility] [address] [place] [address]        Equal Access to Services     GEMA MORA AH: Hadii aad ku hadasho Soomaali, waaxda luqadaha, qaybta kaalmada adeegyada, waxay ryanin haysolitarion marko hannon . So [facility] [phone].    ATENCIÓN: Si habla español, tiene a jiang disposición servicios gratuitos de asistencia lingüística. Llame al [phone].    We comply with applicable federal civil rights laws and [place] laws. We do not discriminate on the basis of race, color, national origin, age, disability, sex, sexual orientation, or gender identity.            Thank you!     Thank you for choosing [facility]  for your care. Our goal is always to provide you with excellent care. Hearing back from our patients is one way we can continue to improve our services. Please take a few minutes to complete the written survey that you may receive in the mail after your visit with us. Thank you!             Your Updated Medication List - Protect others around you: Learn how to safely use, store and throw away your medicines at www.disposemymeds.org.          This list is accurate as of 3/8/18 11:08 AM.  Always use your most recent med list.                   Brand Name Dispense Instructions for use Diagnosis    amitriptyline 10 MG tablet    ELAVIL     Take 20 mg by mouth At Bedtime        escitalopram 10 MG tablet    LEXAPRO     Take 10 mg by mouth daily

## 2018-03-08 NOTE — LETTER
3/8/2018       RE: Jose Alberto Farah  [address]  [place] [address]     Dear Colleague,    Thank you for referring your patient, Jose Alberto Farah, to the [facility] NEUROLOGY at [facility]. Please see a copy of my visit note below.    [id number]    Service Date: 2018      Glory Roe MD   [facility]    [address] [place] [address] [address]   [place] [address]      Pedro Alonzo MD   [facility]    [address]   [place] [address]      RE: Jose Alberto Farah   MRN: [id number]   : 1965      Dear Doctors:      I extended Jose Alberto Farah's electrodiagnostic studies to include the upper limbs today and met with him and his friend, Cristobal, afterwards to discuss my findings.  Electrodiagnostic studies demonstrated that the previously noted fibrillation potentials and fasciculations also involve the thoracic region and multiple segments in the cervical region.  I personally reviewed his cervical, thoracic and lumbar imaging, which demonstrates no definite signal change or compression.      I explained the following to Jose Alberto:  I recognize Dr. Alonzo' concern about HSP given the spasticity with relatively preserved strength; however, I am confident that the more likely diagnosis is ALS based upon the presence of upper and lower motor neuron findings without sensory signs or symptoms involving cervical, thoracic and lumbosacral segments, the relatively abrupt presentation, and the absence of a family history of HSP.  Jose Alberto was appropriately saddened by this news having had a friend who passed away from ALS.  He had limited interest in extensive discussion of the condition, but I did explain the prognosis and management.  He indicated that he would want comfort measures at the end of life and I emphasized that this is absolutely possible and standard.  He did remind me that at the onset of his symptoms last year he developed symptoms of  depression.  He volunteered that he will stay on his current antidepressant medication and I indicated that we could possibly increase the dose if he felt it was warranted.  He denied thoughts of self-harm but did make it clear that when no longer able to care for himself he would not want to prolong life.  He denied a history of hospitalization for depression, suicidal ideation or attempts, and denied suicidal ideation or risk currently.     Jose Alberto also met with our physical therapist and .  We offered information from other members of our multidisciplinary care team but today he prefers to hold off until his next visit.  He is interested in riluzole and we will start him on this, assuming appropriate liver function tests.  I explained that it is critically important that he not drink heavily while on riluzole and that we monitor his liver panel.  He may have some interest in Radicava as well.  I explained the options for participation in clinical trials which are currently numerous.  He did express interest in meeting with our clinical psychologist at a future date as well but not today.  We may arrange a home safety evaluation in addition.  Our representatives of the ALS Association and Muscular Dystrophy Association and will reach out to him as well.        All questions were answered and Jose Alberto will return in several weeks.      Sincerely,       Jose Alberto Santiago MD     D: 2018   T: 2018   MT: AKA      Name:     JOSE ALBERTO SLOAN   MRN:      [id number]        Account:      [id number]   :      1965           Service Date: 2018      Document: [id number]

## 2018-03-09 NOTE — PROGRESS NOTES
Service Date: 2018      Glory Roe MD   [facility]    [address]   [place] [address]      Pedro Alonzo MD   [facility]    [address]   [place] [address]      RE: Jose Alberto Farah   MRN: [id number]   : 1965      Dear Doctors:      I extended Jose Alberto Farah's electrodiagnostic studies to include the upper limbs today and met with him and his friend, Cristobal, afterwards to discuss my findings.  Electrodiagnostic studies demonstrated that the previously noted fibrillation potentials and fasciculations also involve the thoracic region and multiple segments in the cervical region.  I personally reviewed his cervical, thoracic and lumbar imaging, which demonstrates no definite signal change or compression.      I explained the following to Jose Alberto:  I recognize Dr. Alonzo' concern about HSP given the spasticity with relatively preserved strength; however, I am confident that the more likely diagnosis is ALS based upon the presence of upper and lower motor neuron findings without sensory signs or symptoms involving cervical, thoracic and lumbosacral segments, the relatively abrupt presentation, and the absence of a family history of HSP.  Jose Alberto was appropriately saddened by this news having had a friend who passed away from ALS.  He had limited interest in extensive discussion of the condition, but I did explain the prognosis and management.  He indicated that he would want comfort measures at the end of life and I emphasized that this is absolutely possible and standard.  He did remind me that at the onset of his symptoms last year he developed symptoms of depression.  He volunteered that he will stay on his current antidepressant medication and I indicated that we could possibly increase the dose if he felt it was warranted.  He denied thoughts of self-harm but did make it clear that when no longer able to care for himself he would  not want to prolong life.  He denied a history of hospitalization for depression, suicidal ideation or attempts, and denied suicidal ideation or risk currently.     Jose Alberto also met with our physical therapist and .  We offered information from other members of our multidisciplinary care team but today he prefers to hold off until his next visit.  He is interested in riluzole and we will start him on this, assuming appropriate liver function tests.  I explained that it is critically important that he not drink heavily while on riluzole and that we monitor his liver panel.  He may have some interest in Radicava as well.  I explained the options for participation in clinical trials which are currently numerous.  He did express interest in meeting with our clinical psychologist at a future date as well but not today.  We may arrange a home safety evaluation in addition.  Our representatives of the ALS Association and Muscular Dystrophy Association and will reach out to him as well.        All questions were answered and Jose Alberto will return in several weeks.      Sincerely,       MD JOSE ALBERTO Gaspar MD             D: 2018   T: 2018   MT: AKA      Name:     JOSE ALBERTO SLOAN   MRN:      [id number]        Account:      [id number]   :      1965           Service Date: 2018      Document: [id number]

## 2018-03-12 DIAGNOSIS — G12.21 ALS (AMYOTROPHIC LATERAL SCLEROSIS) (H): Primary | ICD-10-CM

## 2018-03-14 LAB
EXPTIME-PRE: 7.89 SEC
FEF2575-%PRED-PRE: 67 %
FEF2575-PRE: 2.35 L/SEC
FEF2575-PRED: 3.5 L/SEC
FEFMAX-%PRED-PRE: 80 %
FEFMAX-PRE: 8 L/SEC
FEFMAX-PRED: 9.91 L/SEC
FEV1-%PRED-PRE: 91 %
FEV1-PRE: 3.65 L
FEV1FEV6-PRE: 69 %
FEV1FEV6-PRED: 80 %
FEV1FVC-PRE: 68 %
FEV1FVC-PRED: 77 %
FIFMAX-PRE: 7.1 L/SEC
FVC-%PRED-PRE: 106 %
FVC-PRE: 5.39 L
FVC-PRED: 5.07 L
MEP-PRE: 120 CMH2O
MIP-PRE: -115 CMH2O

## 2018-03-16 ENCOUNTER — TELEPHONE (OUTPATIENT)
Dept: NEUROLOGY | Facility: CLINIC | Age: 53
End: 2018-03-16

## 2018-03-16 NOTE — TELEPHONE ENCOUNTER
Social Work Follow-Up  [facility]    Data/Intervention:  Patient Name:  Jose Alberto Farah  /Age:  1965 (52 year old)    Reason for Follow-Up:  Coping and community services    Collaborated With:    -Jose Alberto    Intervention/Education/Resources Provided:  F/u call with Pt to check in re coping and services. He hasn't received a call yet from [employer]. Discussed [employer] as he hasn't been driving. He indicated he has friends helping him now but he might want it in the future. Will initiate application and mail to him for completing. He also hasn't heard about the home PT evaluation. He has phone appt with [employer] disability on Monday. He receives work related disability benefits and he is making ends meet. He received info in the mail from [employer].   Inquired about whether he feels he has enough help at home now. He stated he does but is not sure about the future. He likely isn't eligible for any services thru the [facility] so would need to pay privately when they are needed. Indicated to him that I can assist with helping him get more services at home or arranging for other care if needed.   He feels he is OK emotionally. He is wondering about starting treatment and wants to discuss that. He doesn't recall having that conversation at his appt last week. Will notify Dr Santiago/Cookie Barney LPN    Assessment/Plan:  Pt has friends and his brother Lali who lives nearby for support and help. He is planning ahead and wants to move forward with treatment as he feels progression of symptoms. He has had more time to cope with the news of having ALS and was better emotionally today. Will mail [employer] application and notify Dr Santiago of interest in starting treatment prior to next appt if possible.     Previously provided patient/family with writer's contact information and availability. Genna Russo, GRANT, [facility]    [facility]  [facility]  [phone]

## 2018-03-23 ENCOUNTER — HOSPITAL ENCOUNTER (OUTPATIENT)
Dept: PHYSICAL THERAPY | Facility: CLINIC | Age: 53
Setting detail: THERAPIES SERIES
End: 2018-03-23
Attending: PSYCHIATRY & NEUROLOGY
Payer: COMMERCIAL

## 2018-03-23 PROCEDURE — 40000178 ZZH STATISTIC PT HOME VISIT-NEURO/BAL: Performed by: PHYSICAL THERAPIST

## 2018-03-23 PROCEDURE — 97110 THERAPEUTIC EXERCISES: CPT | Mod: GP | Performed by: PHYSICAL THERAPIST

## 2018-03-23 PROCEDURE — 97530 THERAPEUTIC ACTIVITIES: CPT | Mod: GP | Performed by: PHYSICAL THERAPIST

## 2018-03-23 PROCEDURE — 97162 PT EVAL MOD COMPLEX 30 MIN: CPT | Mod: GP | Performed by: PHYSICAL THERAPIST

## 2018-03-24 NOTE — PROGRESS NOTES
"Outpatient Physical Therapy Evaluation  [facility]    Type of Visit: Evaluation and treatment; Home safety evaluation for ALS patient    Date of Service: 3/23/18    Physical Therapy Order/Referring Provider: PT: eval & treat; home safety evaluation / Dr. Jose Alberto Santiago    Medical Diagnosis: Amyotrophic Lateral Sclerosis    Pertinent history of current problem (include personal factors and/or comorbidities that impact the plan of care): patient recently given diagnosis of ALS (3/8/18) and is still coping with the shock of the diagnosis; he has had several falls, but states that he has only fallen 3 times since obtaining 4WW for mobility; he is referred for PT home safety eval & treat to assess for current & future equipment needs, instruct in HEP to address spasticity    Living/Social History: Patient lives in his own home (1 level with basement) with a good friend; he is no longer working, but his friend works from 6 pm to 6 am    ENVIRONMENTAL ASSESSMENT    Entrance Width Measurement  (Inches) Threshhold Height  (Inches) Stairs/Rails   Front 36 1 4 steps, no rails   Back 32 1 4 steps with rails   Side 32 1 3 steps, 1 rail            Bedroom Measurement (Inches)   Doorway 30   Bed Height 26         Bathroom Measurement (Inches)   Doorway 27   Toilet Height 15   Space around toilet 6\" on right, 8\" on left   Tub Wall Height 13         Current Equipment: tub wall rail, shower chair with back, raised toilet seat with armrests, two 4 wheeled walkers, transport chair, single end cane    Accessibility Issues: stairs to access house at all entrances. Patient utilizes the side door to access the house; this enters into a small foyer measuring 38 inches wide by 78 inches long. To the right is the door to the living room (29 inches wide) & to the left is the door to the kitchen (34 inches wide). Kitchen can also be accessed from hallway thru 29 inch door. Hallway is 36 inches wide & leads to bedroom & " bathroom (noted above). The width of the bathroom is 35 inches from vanity on right to tub on left; there is 31 inches from the toilet to the tub wall    EXAMINATION    Communication/Affect/Cognition: alert & oriented x 3; somewhat flat affect    ROM/Strength Right PROM Left PROM Right MMT Left MMT     Shoulder flexion WFL WFL 4 4   Shoulder abduction WFL WFL 4 4   Shoulder external rotation WFL WFL NT NT   Elbow flexion WFL WFL 4+ 4+   Elbow extension WFL WFL 4 4   Wrist flexion WFL WFL 4 4   Wrist extension WFL WFL 4 4+          Hip flexion WFL WFL 4 4   Hip extension NT NT NT NT   Hip abduction WFL WFL NT NT   Knee extension Ham tightness Ham tightness 5 4+   Knee flexion WFL WFL 5 5   Ankle dorsiflexion 0 w/knee flexed 0 w/knee flexed 3- 3-   Ankle plantar flexion WFL WFL NT NT              Spasticity (Modified Lamin Scale)   Right Left   Shoulder 1 1   Elbow 2 2   Wrist 1+ 1+   Hip 1+ 1+   Knee 2 2   Ankle 3-4 beats of clonus 3-4 beats of clonus          Locomotion: independent ambulation with use of 4WW; exhibits forward flexed posture, very spastic, very labored pattern with decreased hip & knee flexion bilaterally during swing phase, no foot clearance bilaterally during swing phase, slow janene with increased time in double stance    Transfers: independent sit to/from supine with increased effort; independent sit to/from stand with need for U/E support    Balance: sitting- good without U/E support at edge of bed; standing- requires at least single U/E support & is diminished due to bilateral L/E weakness & L/E spasticity    Fall Risk: increased related to bilateral L/E weakness & L/E spasticity    Pain: notes bilateral shoulder pain & posterior cervical pain    Educational Assessment:   Learner(s): Patient and his friend  Barriers to Learning: none    CLINICAL IMPRESSIONS  Physical Therapy Diagnosis: Impaired motor function and sensory integrity associated with progressive disorders of the central  nervous system.    Impairments:    1) decreased strength x 4 extremities  2) decreased flexibility bilateral hamstrings & gastrocs  3) increased tone x 4 extremities  4) shoulder & neck pain    Clinical Presentation: Evolving/Changing  Clinical Presentation Rationale: increased difficulty with bed mobility, transfers & gait; recent falls; weakness x 4 extremities, increased tone x 4 extremities; shoulder & cervical pain; receives assistance from his friend, but is alone at night  Clinical Decision Making (Complexity): Moderate complexity    Plan of Care:  Physical therapy intervention indicated. 1 session evaluation and treatment.    Goals: Patient stated goals (to be attained by end of PT visit on 3/23/18)  1) Patient will and his friend will verbalize understanding of education provided regarding equipment recommendations and proper use.  This goal was attained by the end of the visit this date.    2) Patient will verbalize understanding and demonstrate proper technique for standing gastroc stretches, seated hamstring & gluteal stretches.  This goal was attained by the end of the visit this date.      TREATMENT  Treatment provided this date:  Therapeutic procedure, 15 minutes  Therapeutic activities, 40 minutes    Skilled Intervention/Response to Treatment:  Provided education to patient and his friend in the role of PT for persons with ALS; explained importance of having measurements of the home environment to adequately address equipment needs & make appropriate recommendations. Use of illustrations to provide education regarding various styles of shower chairs & tub benches, including tub-slide shower chair system for future needs. Explained pros/cons of each style; he has a standard shower chair with back that he does not use and his friend expresses concerns regarding his ability to safely step over tub wall, even with the tub wall rail. Patient decided that an extended tub bench with sliding seat feature  would be acceptable at this time - he feels that tub-slide system is not presently needed despite his friend's encouragement to try as it would improve energy conservation but decreasing the number of transfers required. Provided education regarding benefits of adjustable bed (hospital bed) to improve efficiency with bed mobility & allow for ease of repositioning at night. Patient and his friend verbalize understanding of education provided; patient will consider hospital bed but does not want to pursue at this time. Will make request for extended tub bench with sliding seat feature from ALS equipment loan closet. Instructed in standing gastroc stretches, seated hamstring stretches & seated low back/gluteal stretch. Demonstrated each stretch for patient and then had him return demonstration of the stretches to PT; will send written instructions with illustrations. Patient inquired about medication to assist with managing L/E spasticity; explained that this would need to be discussed with MD, but that PT will let Dr. Santiago know of his inquiry. Also had questions regarding future assistance in the home environment; will reach out to ELISEO Bonilla to let her know of patient's questions.     Goal Attainment: All goals met.    Risks and benefits of evaluation/treatment have been explained.  Patient and his friend are in agreement with Plan of Care    Evaluation time: 30 min  Treatment time: 55 min   Total contact time: 85 min    Signature/Credientials: Amy Barrett, PT  Date: 3/23/18    Certification:   KELLY

## 2018-04-17 ENCOUNTER — TELEPHONE (OUTPATIENT)
Dept: CARE COORDINATION | Facility: CLINIC | Age: 53
End: 2018-04-17

## 2018-04-17 NOTE — TELEPHONE ENCOUNTER
Social Work Intervention  [facility]    Data/Intervention:    Patient Name:  Jose Alberto Farah  /Age:  1965 (52 year old)    Visit Type: telephone    Collaborated With:    -Jose Alberto's friend/caregiver Cristobal Layne    Patient Concerns/Issues:   Pt's lifelong friend and now his caregiver Cristobal called me to say that Jose Alberto doesn't want to come to his appt on Thursday. He doesn't see the point in coming. Cristobal indicates that the ALS appears to be advancing quickly and that Jose Alberto is needing more and more assistance. He just left a message for Sona Barrett PT to see if she could come back.  Discussed that there are many reasons to come back miller due to his needs for more DME including likely a WC and need for home care orders as well as assessing his changes, what can be done to mitigate and help them cope. Discussed the many reasons for him to come. Cristobal will try to let him know this. Offered that we can contact Jose Alberto directly if that would be of help.  Cristobal described him as very stubborn. He is also likely depressed.    Intervention/Education/Resources Provided:  Information about the usefulness of ALS clinic and seeing Dr Santiago    Assessment/Plan:  Will await Cristobal' call back if he would like us to contact Jose Alberto directly.  Will inform Dr Santiago/Cookie Barney LPN    Provided patient/family with contact information and availability.    GRANT Bonilla, [facility]    [facility]  [phone]

## 2018-04-20 ENCOUNTER — TELEPHONE (OUTPATIENT)
Dept: NEUROLOGY | Facility: CLINIC | Age: 53
End: 2018-04-20

## 2018-04-20 NOTE — TELEPHONE ENCOUNTER
[facility] Call Center    Phone Message    May a detailed message be left on voicemail: yes    Reason for Call: Order(s): Home Care Orders: Other: Call from PT's friend Cristobal as they missed appts due to inability to transport and they are requesting orders for home care mainly from 6pm to 6am while Cristobal is working as PT is getting worse and can hardly walk.  They would also like to get info on having a doctor come to the home.  Please call Cristobal with more info    Action Taken: Message routed to:  [facility] ([facility]): .

## 2018-04-23 ENCOUNTER — TELEPHONE (OUTPATIENT)
Dept: NEUROLOGY | Facility: CLINIC | Age: 53
End: 2018-04-23

## 2018-04-23 NOTE — TELEPHONE ENCOUNTER
Left message for patient caregiver regarding need for patient to have follow-up appointment for physician to be able to order Home Care Services. Contact information provided.

## 2018-05-05 ENCOUNTER — MEDICAL CORRESPONDENCE (OUTPATIENT)
Dept: HEALTH INFORMATION MANAGEMENT | Facility: CLINIC | Age: 53
End: 2018-05-05

## 2018-06-06 DIAGNOSIS — G12.21 ALS (AMYOTROPHIC LATERAL SCLEROSIS) (H): ICD-10-CM

## 2018-06-06 RX ORDER — BACLOFEN 10 MG/1
TABLET ORAL
Qty: 90 TABLET | Refills: 1 | Status: SHIPPED | OUTPATIENT
Start: 2018-06-06 | End: 2018-08-07

## 2018-06-07 ENCOUNTER — TRANSFERRED RECORDS (OUTPATIENT)
Dept: HEALTH INFORMATION MANAGEMENT | Facility: CLINIC | Age: 53
End: 2018-06-07

## 2018-08-07 DIAGNOSIS — G12.21 ALS (AMYOTROPHIC LATERAL SCLEROSIS) (H): ICD-10-CM

## 2018-08-07 RX ORDER — BACLOFEN 10 MG/1
TABLET ORAL
Qty: 90 TABLET | Refills: 2 | Status: SHIPPED | OUTPATIENT
Start: 2018-08-07

## 2018-08-09 ENCOUNTER — MEDICAL CORRESPONDENCE (OUTPATIENT)
Dept: HEALTH INFORMATION MANAGEMENT | Facility: CLINIC | Age: 53
End: 2018-08-09

## 2019-01-17 ENCOUNTER — MEDICAL CORRESPONDENCE (OUTPATIENT)
Dept: HEALTH INFORMATION MANAGEMENT | Facility: CLINIC | Age: 54
End: 2019-01-17